# Patient Record
(demographics unavailable — no encounter records)

---

## 2024-12-31 NOTE — DATA REVIEWED
[FreeTextEntry1] : EXAMINATION: US RENAL AND PELVIS 12/27/24 IN OFFICE     FINDINGS: SOLITARY RIGHT KIDNEY WITH GRADE 2 HYDRONEPHROSIS OTHERWISE UNREMARKABLE KIDNEY AND PELVIC STRUCTURES.

## 2024-12-31 NOTE — REASON FOR VISIT
[Initial Consultation] : an initial consultation [Prenatal hydronephrosis] : prenatal hydronephrosis [Parents] : parents [TextBox_50] : left renal agenesis

## 2024-12-31 NOTE — CONSULT LETTER
[FreeTextEntry1] : Dear Dr. STEVEN BRUNNER,      I had the pleasure of consulting on BRAULIO VELASQUEZ today.  Below is my note regarding the office visit today.      Thank you so very much for allowing me to participate in BRAULIO's care.  Please don't hesitate to call me should any questions or issues arise.        Sincerely,     Anshul Campos MD, FACS, [facility]    Chief, Pediatric Urology    Professor of Urology and Pediatrics    [employer] [employer]        President, [employer] - [place] Section    Past-President, Societies [employer] Pediatric U[employer]

## 2024-12-31 NOTE — CONSULT LETTER
[FreeTextEntry1] : Dear Dr. STEVEN BRUNNER,      I had the pleasure of consulting on BRAULIO VELASQUEZ today.  Below is my note regarding the office visit today.      Thank you so very much for allowing me to participate in BRAULIO's care.  Please don't hesitate to call me should any questions or issues arise.        Sincerely,     Anshul Campos MD, FACS, [facility]    Chief, Pediatric Urology    Professor of Urology and Pediatrics    [facility] [employer]        President, [employer] - [place] Section    Past-President, Societies [employer] Pediatric U[employer]

## 2024-12-31 NOTE — CONSULT LETTER
[FreeTextEntry1] : Dear Dr. STEVEN BRUNNER,      I had the pleasure of consulting on BRAULIO VELASQUEZ today.  Below is my note regarding the office visit today.      Thank you so very much for allowing me to participate in BRAULIO's care.  Please don't hesitate to call me should any questions or issues arise.        Sincerely,     Anshul Campos MD, FACS, [facility]    Chief, Pediatric Urology    Professor of Urology and Pediatrics    [facility] [facility] [employer]        President, [employer] Association - [place] Section    Past-President, Societies [employer] Pediatric Urology

## 2024-12-31 NOTE — HISTORY OF PRESENT ILLNESS
[Home] : at home, [unfilled] , at the time of the visit. [Medical Office: ([facility])___] : at the medical office located in  [Parents] : parents [TextBox_4] : I verified the identity of the patient and the reason for the appointment with the parent. I explained to the parent that telemedicine encounters are not the same as a direct patient/healthcare provider visit because the patient and healthcare provider are not in the same room, which can result in limitations, including with the physical examination. I explained that the telemedicine encounter may require the patient's genitalia to be shown. I explained that after the telemedicine encounter, the patient may require an office visit for an in-person physical examination, ultrasound, or other testing. I informed the parent that there may be privacy risks associated with the use of the technology and that there may be costs associated with the encounter. I offered the option of an office visit rather than a telemedicine encounter. Parent stated that all explanations were understood, and that all questions were answered to their satisfaction. The parent verbalized their preference and consent to proceed with the telemedicine encounter.   BRAULIO is here for an initial consultation. He was born at term after an unassisted conception and uneventful pregnancy. 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 No other anomalies noted, and the amniotic fluid levels were normal. Mother did not have a prenatal appointment with our office. A renal ultrasound at birth was not obtained. Initial in-office ultrasound (12/27/24) demonstrated a solitary right kidney with grade 2 hydronephrosis. Postpartum he has been well without any issues feeding or voiding. No fevers or UTIs.

## 2024-12-31 NOTE — CONSULT LETTER
[FreeTextEntry1] : Dear Dr. STEVEN BRUNNER,      I had the pleasure of consulting on BRAULIO VELASQUEZ today.  Below is my note regarding the office visit today.      Thank you so very much for allowing me to participate in BRAULIO's care.  Please don't hesitate to call me should any questions or issues arise.        Sincerely,     Anshul Campos MD, FACS, [facility]    Chief, Pediatric Urology    Professor of Urology and Pediatrics    [facility] [facility] [employer]        President, American U[employer] Association - [place] Section    Past-President, Societies [employer] Pediatric Urology

## 2024-12-31 NOTE — HISTORY OF PRESENT ILLNESS
[Home] : at home, [unfilled] , at the time of the visit. [Medical Office: ([facility])___] : at the medical office located in  [Parents] : parents [TextBox_4] : I verified the identity of the patient and the reason for the appointment with the parent. I explained to the parent that telemedicine encounters are not the same as a direct patient/healthcare provider visit because the patient and healthcare provider are not in the same room, which can result in limitations, including with the physical examination. I explained that the telemedicine encounter may require the patient's genitalia to be shown. I explained that after the telemedicine encounter, the patient may require an office visit for an in-person physical examination, ultrasound, or other testing. I informed the parent that there may be privacy risks associated with the use of the technology and that there may be costs associated with the encounter. I offered the option of an office visit rather than a telemedicine encounter. Parent stated that all explanations were understood, and that all questions were answered to their satisfaction. The parent verbalized their preference and consent to proceed with the telemedicine encounter.   BRAULIO is here for an initial consultation. He was born at term after an unassisted conception and uneventful pregnancy. 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 No other anomalies noted, and the amniotic fluid levels were normal. Mother did not have a prenatal appointment with our office. A renal ultrasound at birth was not obtained. Initial in-office ultrasound (12/27/24) demonstrated a solitary right kidney with grade 2 hydronephrosis. Postpartum he has been well without any issues feeding or voiding. No fevers or UTIs.

## 2024-12-31 NOTE — ASSESSMENT
[FreeTextEntry1] : BRAULIO with a solitary right kidney with grade 2 hydronephrosis.   I explained the condition, its possible causes and implications.  We discussed the evaluation and possible management strategies.  Imaging in this case includes a sonogram, which was done and a VCUG.  I described the VCUG test. I answered all questions. We will reconvene after the study.  I also discussed the importance of being on antibiotics during the VCUG to avert infection.

## 2024-12-31 NOTE — HISTORY OF PRESENT ILLNESS
[Home] : at home, [unfilled] , at the time of the visit. [Medical Office: ([facility])___] : at the medical office located in  [Parents] : parents [TextBox_4] : I verified the identity of the patient and the reason for the appointment with the parent. I explained to the parent that telemedicine encounters are not the same as a direct patient/healthcare provider visit because the patient and healthcare provider are not in the same room, which can result in limitations, including with the physical examination. I explained that the telemedicine encounter may require the patient's genitalia to be shown. I explained that after the telemedicine encounter, the patient may require an office visit for an in-person physical examination, ultrasound, or other testing. I informed the parent that there may be privacy risks associated with the use of the technology and that there may be costs associated with the encounter. I offered the option of an office visit rather than a telemedicine encounter. Parent stated that all explanations were understood, and that all questions were answered to their satisfaction. The parent verbalized their preference and consent to proceed with the telemedicine encounter.   BRAULIO is here for an initial consultation. He was born at term after an unassisted conception and uneventful pregnancy. 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 No other anomalies noted, and the amniotic fluid levels were normal. Mother did not have a prenatal appointment with our office. A renal ultrasound at birth was not obtained. Initial in-office ultrasound (12/27/24) demonstrated a solitary right kidney with grade 2 hydronephrosis. Postpartum he has been well without any issues feeding or voiding. No fevers or UTIs.

## 2024-12-31 NOTE — HISTORY OF PRESENT ILLNESS
[Home] : at home, [unfilled] , at the time of the visit. [Medical Office: ([facility])___] : at the medical office located in  [Parents] : parents [TextBox_4] : I verified the identity of the patient and the reason for the appointment with the parent. I explained to the parent that telemedicine encounters are not the same as a direct patient/healthcare provider visit because the patient and healthcare provider are not in the same room, which can result in limitations, including with the physical examination. I explained that the telemedicine encounter may require the patient's genitalia to be shown. I explained that after the telemedicine encounter, the patient may require an office visit for an in-person physical examination, ultrasound, or other testing. I informed the parent that there may be privacy risks associated with the use of the technology and that there may be costs associated with the encounter. I offered the option of an office visit rather than a telemedicine encounter. Parent stated that all explanations were understood, and that all questions were answered to their satisfaction. The parent verbalized their preference and consent to proceed with the telemedicine encounter.   BRAULIO is here for an initial consultation. He was born at term after an unassisted conception and uneventful pregnancy. 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 No other anomalies noted, and the amniotic fluid levels were normal. Mother did not have a prenatal appointment with our office. A renal ultrasound at birth was not obtained. Initial in-office ultrasound (12/27/24) demonstrated a solitary right kidney with grade 2 hydronephrosis. Postpartum he has been well without any issues feeding or voiding. No fevers or UTIs.